# Patient Record
Sex: MALE | Race: WHITE | NOT HISPANIC OR LATINO | Employment: FULL TIME | URBAN - METROPOLITAN AREA
[De-identification: names, ages, dates, MRNs, and addresses within clinical notes are randomized per-mention and may not be internally consistent; named-entity substitution may affect disease eponyms.]

---

## 2018-09-26 ENCOUNTER — OFFICE VISIT (OUTPATIENT)
Dept: URGENT CARE | Facility: MEDICAL CENTER | Age: 33
End: 2018-09-26
Payer: COMMERCIAL

## 2018-09-26 VITALS
DIASTOLIC BLOOD PRESSURE: 84 MMHG | HEIGHT: 69 IN | SYSTOLIC BLOOD PRESSURE: 121 MMHG | HEART RATE: 65 BPM | RESPIRATION RATE: 16 BRPM | WEIGHT: 164 LBS | BODY MASS INDEX: 24.29 KG/M2 | TEMPERATURE: 98.2 F

## 2018-09-26 DIAGNOSIS — J02.9 ACUTE PHARYNGITIS, UNSPECIFIED ETIOLOGY: Primary | ICD-10-CM

## 2018-09-26 LAB — S PYO AG THROAT QL: NEGATIVE

## 2018-09-26 PROCEDURE — 99203 OFFICE O/P NEW LOW 30 MIN: CPT

## 2018-09-26 PROCEDURE — 87430 STREP A AG IA: CPT

## 2018-09-26 PROCEDURE — S9088 SERVICES PROVIDED IN URGENT: HCPCS

## 2018-09-26 RX ORDER — AZITHROMYCIN 250 MG/1
TABLET, FILM COATED ORAL
Qty: 6 TABLET | Refills: 0 | Status: SHIPPED | OUTPATIENT
Start: 2018-09-26 | End: 2018-09-26 | Stop reason: SDUPTHER

## 2018-09-26 RX ORDER — AZITHROMYCIN 250 MG/1
TABLET, FILM COATED ORAL
Qty: 6 TABLET | Refills: 0 | Status: SHIPPED | OUTPATIENT
Start: 2018-09-26 | End: 2018-09-30

## 2018-09-26 NOTE — PATIENT INSTRUCTIONS
Pharyngitis  zithromax as directed  Follow up with PCP in 3-5 days  Proceed to  ER if symptoms worsen  Pharyngitis   WHAT YOU NEED TO KNOW:   Pharyngitis, or sore throat, is inflammation of the tissues and structures in your pharynx (throat)  Pharyngitis is most often caused by bacteria  It may also be caused by a cold or flu virus  Other causes include smoking, allergies, or acid reflux  DISCHARGE INSTRUCTIONS:   Call 911 for any of the following:   · You have trouble breathing or swallowing because your throat is swollen or sore  Return to the emergency department if:   · You are drooling because it hurts too much to swallow  · Your fever is higher than 102? F (39?C) or lasts longer than 3 days  · You are confused  · You taste blood in your throat  Contact your healthcare provider if:   · Your throat pain gets worse  · You have a painful lump in your throat that does not go away after 5 days  · Your symptoms do not improve after 5 days  · You have questions or concerns about your condition or care  Medicines:  Viral pharyngitis will go away on its own without treatment  Your sore throat should start to feel better in 3 to 5 days for both viral and bacterial infections  You may need any of the following:  · Antibiotics  treat a bacterial infection  · NSAIDs , such as ibuprofen, help decrease swelling, pain, and fever  NSAIDs can cause stomach bleeding or kidney problems in certain people  If you take blood thinner medicine, always ask your healthcare provider if NSAIDs are safe for you  Always read the medicine label and follow directions  · Acetaminophen  decreases pain and fever  It is available without a doctor's order  Ask how much to take and how often to take it  Follow directions  Acetaminophen can cause liver damage if not taken correctly  · Take your medicine as directed    Contact your healthcare provider if you think your medicine is not helping or if you have side effects  Tell him or her if you are allergic to any medicine  Keep a list of the medicines, vitamins, and herbs you take  Include the amounts, and when and why you take them  Bring the list or the pill bottles to follow-up visits  Carry your medicine list with you in case of an emergency  Manage your symptoms:   · Gargle salt water  Mix ¼ teaspoon salt in an 8 ounce glass of warm water and gargle  This may help decrease swelling in your throat  · Drink liquids as directed  You may need to drink more liquids than usual  Liquids may help soothe your throat and prevent dehydration  Ask how much liquid to drink each day and which liquids are best for you  · Use a cool-steam humidifier  to help moisten the air in your room and calm your cough  · Soothe your throat  with cough drops, ice, soft foods, or popsicles  Prevent the spread of pharyngitis:  Cover your mouth and nose when you cough or sneeze  Do not share food or drinks  Wash your hands often  Use soap and water  If soap and water are unavailable, use an alcohol based hand   Follow up with your healthcare provider as directed:  Write down your questions so you remember to ask them during your visits  © 2017 [employer]  Information is for End User's use only and may not be sold, redistributed or otherwise used for commercial purposes  All illustrations and images included in CareNotes® are the copyrighted property of [employer]  or [employer] Hernandes  The above information is an  only  It is not intended as medical advice for individual conditions or treatments  Talk to your doctor, nurse or pharmacist before following any medical regimen to see if it is safe and effective for you

## 2018-09-26 NOTE — PROGRESS NOTES
[facility]        NAME: Donita Duke is a 35 y o  male  : 1985    MRN: [id number]  DATE: 2018  TIME: 7:16 PM    Assessment and Plan   Acute pharyngitis, unspecified etiology [J02 9]  1  Acute pharyngitis, unspecified etiology  azithromycin (ZITHROMAX) 250 mg tablet         Patient Instructions     Pharyngitis  zithromax as directed  Follow up with PCP in 3-5 days  Proceed to  ER if symptoms worsen  Chief Complaint     Chief Complaint   Patient presents with    Sore Throat     started 3 days ago, had other sx which has subsided  Wife has recently had strep         History of Present Illness       Patient c/o sore throat x 4 days  States his wife was diagnosed with strep 4 days ago  Denies CP, SOB, n/v, diaphoresis        Review of Systems   Review of Systems   Constitutional: Negative  HENT: Positive for sore throat  Negative for congestion, dental problem, ear pain, facial swelling, postnasal drip, rhinorrhea, sinus pain, tinnitus, trouble swallowing and voice change  Eyes: Negative  Respiratory: Negative  Negative for apnea, cough, choking, chest tightness, shortness of breath, wheezing and stridor  Cardiovascular: Negative  Negative for chest pain           Current Medications       Current Outpatient Prescriptions:     fluticasone (VERAMYST) 27 5 MCG/SPRAY nasal spray, 2 sprays into each nostril daily, Disp: , Rfl:     azithromycin (ZITHROMAX) 250 mg tablet, Take 2 tablets today then 1 tablet daily x 4 days, Disp: 6 tablet, Rfl: 0    Current Allergies     Allergies as of 2018    (No Known Allergies)            The following portions of the patient's history were reviewed and updated as appropriate: allergies, current medications, past family history, past medical history, past social history, past surgical history and problem list      Past Medical History:   Diagnosis Date    GERD (gastroesophageal reflux disease)        Past Surgical History: Procedure Laterality Date    NO PAST SURGERIES         No family history on file  Medications have been verified  Objective   /84 (Patient Position: Sitting)   Pulse 65   Temp 98 2 °F (36 8 °C) (Temporal)   Resp 16   Ht 5' 9" (1 753 m)   Wt 74 4 kg (164 lb)   BMI 24 22 kg/m²        Physical Exam     Physical Exam   Constitutional: He appears well-developed and well-nourished  No distress  HENT:   Head: Normocephalic and atraumatic  Right Ear: Hearing, tympanic membrane, external ear and ear canal normal    Left Ear: Hearing, tympanic membrane, external ear and ear canal normal    Mouth/Throat: Uvula is midline and mucous membranes are normal  Oropharyngeal exudate and posterior oropharyngeal erythema present  No posterior oropharyngeal edema or tonsillar abscesses  Eyes: Conjunctivae and EOM are normal  Pupils are equal, round, and reactive to light  Neck: Normal range of motion  Neck supple  Cardiovascular: Normal rate, regular rhythm, normal heart sounds and intact distal pulses  Pulmonary/Chest: Effort normal and breath sounds normal  No respiratory distress  He has no wheezes  He has no rales  He exhibits no tenderness  Lymphadenopathy:     He has cervical adenopathy  Skin: He is not diaphoretic

## 2018-11-11 ENCOUNTER — HOSPITAL ENCOUNTER (EMERGENCY)
Facility: HOSPITAL | Age: 33
Discharge: HOME | End: 2018-11-11
Attending: EMERGENCY MEDICINE
Payer: COMMERCIAL

## 2018-11-11 VITALS
HEART RATE: 73 BPM | RESPIRATION RATE: 20 BRPM | TEMPERATURE: 97.5 F | SYSTOLIC BLOOD PRESSURE: 118 MMHG | BODY MASS INDEX: 25.3 KG/M2 | OXYGEN SATURATION: 99 % | WEIGHT: 171.3 LBS | DIASTOLIC BLOOD PRESSURE: 73 MMHG

## 2018-11-11 DIAGNOSIS — T78.40XA ALLERGIC REACTION, INITIAL ENCOUNTER: Primary | ICD-10-CM

## 2018-11-11 DIAGNOSIS — F41.1 ANXIETY REACTION: ICD-10-CM

## 2018-11-11 PROCEDURE — 99283 EMERGENCY DEPT VISIT LOW MDM: CPT

## 2018-11-11 RX ORDER — PREDNISONE 20 MG/1
60 TABLET ORAL DAILY
Qty: 15 TABLET | Refills: 0 | Status: SHIPPED | OUTPATIENT
Start: 2018-11-11 | End: 2018-11-16

## 2018-11-11 RX ORDER — PREDNISONE 20 MG/1
60 TABLET ORAL ONCE
Status: COMPLETED | OUTPATIENT
Start: 2018-11-11 | End: 2018-11-11

## 2018-11-11 RX ORDER — DIPHENHYDRAMINE HCL 25 MG
25 TABLET ORAL ONCE
Status: COMPLETED | OUTPATIENT
Start: 2018-11-11 | End: 2018-11-11

## 2018-11-11 RX ORDER — MULTIVITAMIN
1 CAPSULE ORAL DAILY
COMMUNITY

## 2018-11-11 RX ADMIN — DIPHENHYDRAMINE HCL 25 MG: 25 TABLET ORAL at 21:25

## 2018-11-11 RX ADMIN — PREDNISONE 60 MG: 20 TABLET ORAL at 21:25

## 2018-11-12 NOTE — ED PROVIDER NOTES
History  Chief Complaint   Patient presents with    Allergic Reaction     per spouse"pt helped his friend move friend has cats pt came home with some hives on his neck but they went away and pt  has since been complaining that his throat is itching and burning wife gave him children benadryl 10ml about 30 mns ago"     40-year-old male presents for evaluation with chief complaint of now resolved hives as well as the sensation of throat closing  Patient has a history of known CAD allergy and today was helping a friend move who has a CT  When he got home he had hives on his neck and on his face  Patient was given some liquid Benadryl that was at home (10 mL of Children's Benadryl presumed 25 mg total)  Since then the hives have improved and resolved (patient also tub shower)  However he continued to have throat closing sensation  Patient believes that this is more likely due to anxiety than allergic reaction as he has a significant history of anxiety attacks  Patient reports he was hyperventilating on the drive over here and had carpal spasm as well as circumoral numbness  Patient states he still has a sensation of throat swelling but his skin is clear at this time  History provided by:  Patient and spouse   used: No    Allergic Reaction   Presenting symptoms: itching, rash and swelling    Presenting symptoms: no difficulty swallowing    Severity:  Mild  Duration:  2 hours  Prior allergic episodes:  Animal allergies  Context: animal exposure    Relieved by: Antihistamines  Worsened by:  Nothing      Prior to Admission Medications   Prescriptions Last Dose Informant Patient Reported? Taking?    Multiple Vitamin (MULTIVITAMIN) capsule   Yes Yes   Sig: Take 1 capsule by mouth daily   fluticasone (VERAMYST) 27 5 MCG/SPRAY nasal spray  Self Yes Yes   Si sprays into each nostril daily      Facility-Administered Medications: None       Past Medical History:   Diagnosis Date    Anxiety attack     GERD (gastroesophageal reflux disease)        Past Surgical History:   Procedure Laterality Date    NO PAST SURGERIES         History reviewed  No pertinent family history  I have reviewed and agree with the history as documented  Social History   Substance Use Topics    Smoking status: Former Smoker    Smokeless tobacco: Never Used    Alcohol use Yes      Comment: occasionally        Review of Systems   Constitutional: Negative for chills, diaphoresis and fever  HENT: Negative for trouble swallowing  Sensation of throat swelling     Respiratory: Negative for shortness of breath  Cardiovascular: Negative for chest pain and palpitations  Gastrointestinal: Negative for diarrhea, nausea and vomiting  Genitourinary: Negative for dysuria and frequency  Skin: Positive for itching and rash  Psychiatric/Behavioral: The patient is nervous/anxious  All other systems reviewed and are negative  Physical Exam  Physical Exam   Constitutional: He is oriented to person, place, and time  He appears well-developed and well-nourished  He appears distressed (mild)  HENT:   Head: Normocephalic and atraumatic  Eyes: Pupils are equal, round, and reactive to light  EOM are normal    Neck: Normal range of motion  No JVD present  Cardiovascular: Normal rate, regular rhythm, normal heart sounds and intact distal pulses  Exam reveals no gallop and no friction rub  No murmur heard  Pulmonary/Chest: Effort normal and breath sounds normal  No respiratory distress  He has no wheezes  He has no rales  He exhibits no tenderness  Musculoskeletal: Normal range of motion  He exhibits no tenderness  Neurological: He is alert and oriented to person, place, and time  Skin: Skin is warm and dry  No rash noted  Psychiatric: His behavior is normal  Judgment and thought content normal  His mood appears anxious  Nursing note and vitals reviewed        Vital Signs  ED Triage Vitals [11/11/18 2103]   Temperature Pulse Respirations Blood Pressure SpO2   97 5 °F (36 4 °C) 95 20 153/81 98 %      Temp Source Heart Rate Source Patient Position - Orthostatic VS BP Location FiO2 (%)   Oral Monitor Lying Right arm --      Pain Score       No Pain           Vitals:    11/11/18 2103 11/11/18 2219   BP: 153/81 118/73   Pulse: 95 73   Patient Position - Orthostatic VS: Lying Lying       Visual Acuity      ED Medications  Medications   diphenhydrAMINE (BENADRYL) tablet 25 mg (25 mg Oral Given 11/11/18 2125)   predniSONE tablet 60 mg (60 mg Oral Given 11/11/18 2125)       Diagnostic Studies  Results Reviewed     None                 No orders to display              Procedures  Procedures       Phone Contacts  ED Phone Contact    ED Course  ED Course as of Nov 11 2238   Sun Nov 11, 2018 2236 Patient is feeling much better, no sensation of throat closing, no return of hives, no shortness of breath  MDM  Number of Diagnoses or Management Options  Allergic reaction, initial encounter: new and does not require workup  Anxiety reaction: new and does not require workup  Diagnosis management comments: 35 y o  male presents with s/s consistent with allergic reaction  Presumed allergen is cat dander  No signs of anaphylaxis at this time  Will treat symptomatically with steroids, antihistamines +/- epinephrine  Risk of Complications, Morbidity, and/or Mortality  Management options: high    Patient Progress  Patient progress: stable    CritCare Time    Disposition  Final diagnoses:    Allergic reaction, initial encounter - acute to cat   Anxiety reaction - acute prompted by allergic reaction     Time reflects when diagnosis was documented in both MDM as applicable and the Disposition within this note     Time User Action Codes Description Comment    11/11/2018 10:37 PM Chichi ROGERS Add [T78 40XA] Allergic reaction, initial encounter     11/11/2018 10:37 PM Mckinley Mcclure Modify [T78 40XA] Allergic reaction, initial encounter acute to cat    11/11/2018 10:37 PM Gianna Tipton Add [F41 1] Anxiety reaction     11/11/2018 10:37 PM Francoise ROGERS Modify [F41 1] Anxiety reaction acute prompted by allergic reaction      ED Disposition     ED Disposition Condition Comment    Discharge  [facility] discharge to home/self care  Condition at discharge: Good        Follow-up Information     Follow up With Specialties Details Why Verna Cardoso MD Family Medicine Schedule an appointment as soon as possible for a visit As needed [id number] [facility] [place] [address] [place] [id number]  [phone]            Patient's Medications   Discharge Prescriptions    PREDNISONE 20 MG TABLET    Take 3 tablets (60 mg total) by mouth daily for 5 days       Start Date: 11/11/2018End Date: 11/16/2018       Order Dose: 60 mg       Quantity: 15 tablet    Refills: 0     No discharge procedures on file      ED Provider  Electronically Signed by           Rowan Gordillo MD  11/11/18 0907

## 2018-11-12 NOTE — DISCHARGE INSTRUCTIONS
General Allergic Reaction   AMBULATORY CARE:   A general allergic reaction  is your body's response to an allergen  Allergens include medicines, food, insect stings, animal dander, mold, latex, chemicals, and dust mites  Pollen from trees, grass, and weeds can also cause an allergic reaction  Seek care immediately if:   · You have a skin rash, hives, swelling, or itching that gets worse  · You have trouble breathing, shortness of breath, wheezing, or coughing  · Your throat tightens, or your lips or tongue swell  · You have trouble swallowing or speaking  · You have dizziness, lightheadedness, fainting, or confusion  · You have nausea, vomiting, diarrhea, or abdominal cramps  · You have chest pain or tightness  Contact your healthcare provider if:   · You have questions or concerns about your condition or care  Treatment for a general allergic reaction  may include medicines to relieve certain allergy symptoms such as itching, sneezing, and swelling  You may take them as a pill or use drops in your nose or eyes  Topical treatments may be given to put directly on your skin to help decrease itching or swelling  Manage allergic reactions:   · Avoid the allergen  that you think may have caused your allergic reaction  · Use cold compresses  on your skin or eyes if they were affected by the allergic reaction  Cold compresses may help to soothe your skin or eyes  · Rinse your nasal passages  with a saline solution  Daily rinsing may help clear your nose of allergens  · Do not smoke  Your allergy symptoms may decrease if you are not around smoke  Nicotine and other chemicals in cigarettes and cigars can also cause lung damage  Ask your healthcare provider for information if you currently smoke and need help to quit  E-cigarettes or smokeless tobacco still contain nicotine  Talk to your healthcare provider before you use these products    Follow up with your healthcare provider as directed:  Write down your questions so you remember to ask them during your visits  © 2017 [employer] Information is for End User's use only and may not be sold, redistributed or otherwise used for commercial purposes  All illustrations and images included in CareNotes® are the copyrighted property of [employer]  or [employer]and[employer]  The above information is an  only  It is not intended as medical advice for individual conditions or treatments  Talk to your doctor, nurse or pharmacist before following any medical regimen to see if it is safe and effective for you

## 2022-11-03 ENCOUNTER — HOSPITAL ENCOUNTER (EMERGENCY)
Facility: HOSPITAL | Age: 37
Discharge: HOME | End: 2022-11-03
Attending: EMERGENCY MEDICINE

## 2022-11-03 VITALS
SYSTOLIC BLOOD PRESSURE: 139 MMHG | HEART RATE: 100 BPM | TEMPERATURE: 98.5 F | DIASTOLIC BLOOD PRESSURE: 86 MMHG | OXYGEN SATURATION: 99 % | RESPIRATION RATE: 18 BRPM

## 2022-11-03 DIAGNOSIS — T70.0XXA OTITIC BAROTRAUMA, INITIAL ENCOUNTER: Primary | ICD-10-CM
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
## 2022-11-12 NOTE — ED ATTENDING ATTESTATION
11/3/2022  Rainer Adams DO, saw and evaluated the patient  I have discussed the patient with the resident/non-physician practitioner and agree with the resident's/non-physician practitioner's findings, Plan of Care, and MDM as documented in the resident's/non-physician practitioner's note, except where noted  All available labs and Radiology studies were reviewed  I was present for key portions of any procedure(s) performed by the resident/non-physician practitioner and I was immediately available to provide assistance  At this point I agree with the current assessment done in the Emergency Department    I have conducted an independent evaluation of this patient a history and physical is as follows:    ED Course         Critical Care Time  Procedures

## 2023-06-10 ENCOUNTER — HOSPITAL ENCOUNTER (EMERGENCY)
Facility: HOSPITAL | Age: 38
Discharge: HOME | End: 2023-06-10
Attending: EMERGENCY MEDICINE
Payer: COMMERCIAL

## 2023-06-10 VITALS
SYSTOLIC BLOOD PRESSURE: 142 MMHG | TEMPERATURE: 98.2 F | HEART RATE: 85 BPM | OXYGEN SATURATION: 96 % | DIASTOLIC BLOOD PRESSURE: 89 MMHG | RESPIRATION RATE: 18 BRPM

## 2023-06-10 DIAGNOSIS — S61.012A LACERATION OF LEFT THUMB WITHOUT FOREIGN BODY WITHOUT DAMAGE TO NAIL, INITIAL ENCOUNTER: Primary | ICD-10-CM

## 2023-06-10 PROCEDURE — 90471 IMMUNIZATION ADMIN: CPT

## 2023-06-10 PROCEDURE — 90715 TDAP VACCINE 7 YRS/> IM: CPT

## 2023-06-10 PROCEDURE — 99282 EMERGENCY DEPT VISIT SF MDM: CPT

## 2023-06-10 RX ADMIN — TETANUS TOXOID, REDUCED DIPHTHERIA TOXOID AND ACELLULAR PERTUSSIS VACCINE, ADSORBED 0.5 ML: 5; 2.5; 8; 8; 2.5 SUSPENSION INTRAMUSCULAR at 16:32

## 2023-06-10 NOTE — ED PROVIDER NOTES
History  Chief Complaint   Patient presents with   • Finger Injury     Laceration from utility knife to left thumb     Yael Massey is a 40year old male with no pertinent PMHx presenting to the ED for a left thumb laceration sustained shortly prior to arrival to the ED  Actively bleeding, has gauze and tape around it currently  Has pain over the area with numbness right around the wound, but otherwise no paresthesia  No associated symptoms  Tetanus status is unknown by the patient  Was using a utility knife cutting when he missed and hit the medial side of his finger  Believes he did not hit the nail bed, knows the nail is still there, unsure if part of the nail was cut  Prior to Admission Medications   Prescriptions Last Dose Informant Patient Reported? Taking? Multiple Vitamin (MULTIVITAMIN) capsule   Yes No   Sig: Take 1 capsule by mouth daily   fluticasone (VERAMYST) 27 5 MCG/SPRAY nasal spray  Self Yes No   Si sprays into each nostril daily      Facility-Administered Medications: None       Past Medical History:   Diagnosis Date   • Anxiety attack    • GERD (gastroesophageal reflux disease)        Past Surgical History:   Procedure Laterality Date   • NO PAST SURGERIES         No family history on file  I have reviewed and agree with the history as documented  E-Cigarette/Vaping   • E-Cigarette Use Never User      E-Cigarette/Vaping Substances   • Nicotine No    • THC No    • CBD No      Social History     Tobacco Use   • Smoking status: Former   • Smokeless tobacco: Never   Vaping Use   • Vaping Use: Never used   Substance Use Topics   • Alcohol use: Yes     Comment: occasionally   • Drug use: No       Review of Systems   Constitutional: Negative for chills, diaphoresis and fever  Eyes: Negative for photophobia and visual disturbance  Respiratory: Negative for cough and shortness of breath  Cardiovascular: Negative for chest pain, palpitations and leg swelling     Gastrointestinal: Negative for nausea and vomiting  Musculoskeletal: Negative for arthralgias and myalgias  Skin: Positive for wound  Negative for color change  Neurological: Negative for light-headedness, numbness and headaches  Psychiatric/Behavioral: Negative for self-injury  Physical Exam  Physical Exam  Vitals reviewed  Constitutional:       General: He is not in acute distress  Appearance: Normal appearance  He is ill-appearing  He is not toxic-appearing or diaphoretic  Comments: Left thumb wrapped in gauze with painters tape holding gauze together  HENT:      Head: Normocephalic and atraumatic  Right Ear: External ear normal       Left Ear: External ear normal       Nose: Nose normal    Eyes:      General: No scleral icterus  Right eye: No discharge  Left eye: No discharge  Extraocular Movements: Extraocular movements intact  Conjunctiva/sclera: Conjunctivae normal    Cardiovascular:      Rate and Rhythm: Normal rate and regular rhythm  Pulses: Normal pulses  Heart sounds: Normal heart sounds  Pulmonary:      Effort: Pulmonary effort is normal       Breath sounds: Normal breath sounds  Abdominal:      General: Abdomen is flat  Musculoskeletal:         General: Normal range of motion  Right wrist: Normal       Left wrist: Normal       Right hand: Normal       Left hand: Laceration and tenderness present  No bony tenderness  Normal range of motion  Normal sensation  Normal capillary refill  Normal pulse  Hands:       Cervical back: Normal range of motion and neck supple  Skin:     General: Skin is warm and dry  Capillary Refill: Capillary refill takes less than 2 seconds  Findings: Laceration present  Comments:   Left medial thumb is with a laceration about 1 5 - 2 cm in length, extending from the side to the tip  No damage to the nailbed/nail  Actively bleeding  No foreign body, no bone or tendon involvement   The laceration appears like he cut the corner of his thumb off vs  laceration where I could pull the sides together to repair  Neurological:      General: No focal deficit present  Mental Status: He is alert  Sensory: Sensation is intact  Psychiatric:         Mood and Affect: Mood normal          Behavior: Behavior normal          Vital Signs  ED Triage Vitals [06/10/23 1532]   Temperature Pulse Respirations Blood Pressure SpO2   98 2 °F (36 8 °C) 95 18 142/89 96 %      Temp src Heart Rate Source Patient Position - Orthostatic VS BP Location FiO2 (%)   -- -- -- -- --      Pain Score       --           Vitals:    06/10/23 1532 06/10/23 1545   BP: 142/89 142/89   Pulse: 95 85         Visual Acuity      ED Medications  Medications   tetanus-diphtheria-acellular pertussis (BOOSTRIX) IM injection 0 5 mL (0 5 mL Intramuscular Given 6/10/23 1632)       Diagnostic Studies  Results Reviewed     None                 No orders to display              Procedures  Procedures         ED Course                                             Medical Decision Making  40year old male presenting with finger injury sustained to the left thumb shortly prior to arrival  Please see physical exam for description of findings  Pt offered a nerve block, but refused stating he would prefer to not have that done  No x rays indicated as the slice is not deep enough where bone would have been involved  Thoroughly cleansed the wound with sterile saline  Applied surgicele, followed by xeroform, followed by 2 gauze rolls, followed by cling wrap  Patient tolerated this well considering no nerve block with lidocaine  Patient remained with good ROM of the thumb  Tetanus status is unknown, offered to the patient and he accepted this  Updated today in the ED  Referral placed to hand surgery to follow up to ensure proper healing  Supportive care discussed to ensure good healing  Pt stable at time of discharge, vital signs reviewed, questions answered   Strict ER return precautions provided/discussed and were well understood by patient  Risk  Prescription drug management  Disposition  Final diagnoses:   Laceration of left thumb without foreign body without damage to nail, initial encounter     Time reflects when diagnosis was documented in both MDM as applicable and the Disposition within this note     Time User Action Codes Description Comment    6/10/2023  4:23 PM Omero Gaston Laceration of left thumb without foreign body without damage to nail, initial encounter       ED Disposition     ED Disposition   Discharge    Condition   Stable    Date/Time   Sat Rashard 10, 2023  4:23 PM    Comment   [address] [place] discharge to home/self care                 Follow-up Information     Follow up With Specialties Details Why Contact Info Additional [address] Emergency Department Emergency Medicine Go to  If symptoms worsen [address] [facility] [id number] [place] [address] Emergency Department, [address] [place], [place], [facility] Maribel Phipps MD Orthopedic Surgery, Hand Surgery Schedule an appointment as soon as possible for a visit   [place]  [phone]             Discharge Medication List as of 6/10/2023  4:28 PM      CONTINUE these medications which have NOT CHANGED    Details   fluticasone (VERAMYST) 27 5 MCG/SPRAY nasal spray 2 sprays into each nostril daily, Historical Med      Multiple Vitamin (MULTIVITAMIN) capsule Take 1 capsule by mouth daily, Historical Med                 PDMP Review     None          ED Provider  Electronically Signed by           Winsome Sanchez PA-C  06/10/23 2040

## 2023-06-10 NOTE — DISCHARGE INSTRUCTIONS
Keep the wrap clean and dry for 4-5 days  Can consider making an appointment with hand surgery, I referred you to Dr Kieran Gutierrez, the phone number to the office is provided below  Ibuprofen and Tylenol rotating doses every 3 hours (Tylenol when you go home, 3 hours later take Ibuprofen, 3 hours later take Tylenol, etc )  Do not exceed 3,000 mg of Tylenol per day (4,000 mg if no other Tylenol in medications  Do not exceed 2,400 mg of ibuprofen per day  Keep up with this to maintain pain relief  Return to the ER for : fevers, chills, red streaking up the arm, worsening pain, worsening bleeding, chest pain, passing out, overall worsening or concerning symptoms

## 2023-06-12 ENCOUNTER — TELEPHONE (OUTPATIENT)
Dept: OBGYN CLINIC | Facility: HOSPITAL | Age: 38
End: 2023-06-12

## 2023-06-12 NOTE — TELEPHONE ENCOUNTER
Patient is being referred to a orthopedics  Please schedule accordingly      [address]   [phone]

## 2023-06-15 ENCOUNTER — OFFICE VISIT (OUTPATIENT)
Dept: OBGYN CLINIC | Facility: CLINIC | Age: 38
End: 2023-06-15
Payer: COMMERCIAL

## 2023-06-15 VITALS
HEART RATE: 69 BPM | WEIGHT: 172 LBS | HEIGHT: 69 IN | BODY MASS INDEX: 25.48 KG/M2 | DIASTOLIC BLOOD PRESSURE: 83 MMHG | SYSTOLIC BLOOD PRESSURE: 116 MMHG

## 2023-06-15 DIAGNOSIS — S61.012A LACERATION OF LEFT THUMB WITHOUT FOREIGN BODY WITHOUT DAMAGE TO NAIL, INITIAL ENCOUNTER: ICD-10-CM

## 2023-06-15 PROCEDURE — 99203 OFFICE O/P NEW LOW 30 MIN: CPT | Performed by: SURGERY

## 2023-06-15 NOTE — PROGRESS NOTES
ORTHOPAEDIC HAND, WRIST, AND ELBOW OFFICE  VISIT       ASSESSMENT/PLAN:      40 y o male with left thumb injury    Etiology of above diagnosis was discussed at length as well as treatment options  Physical exam was performed today  New dressing was applied  Patient can beginning washing area with warm soapy water and letting air dry before applying new dressing  bandaids are ok as well, which ever he is more comfortable with  NSAIDs as needed for pain  Patient will return to office as needed  The patient verbalized understanding of exam findings and treatment plan  We engaged in the shared decision-making process and treatment options were discussed at length with the patient  Surgical and conservative management discussed today along with risks and benefits  Diagnoses and all orders for this visit:    Laceration of left thumb without foreign body without damage to nail, initial encounter  -     Ambulatory Referral to Hand Surgery    Follow Up:  Return if symptoms worsen or fail to improve  To Do Next Visit:  Re-evaluation of current issue  _________________________________________________________________________________________________________________________________________    CHIEF COMPLAINT:  Chief Complaint   Patient presents with   • Left Hand - Pain     Left thumb      SUBJECTIVE:  Rosalene Blizzard is a 40y o  year old RHD male who presents for initial evaluation of left thumb laceration, DOI  6/10/23  Patient reports he was using a new knife while cutting plastic and it sliced through the thumb, no damage to the nail  Patient presented to ED immediately and dressing was placed  He reports tenderness at site  No numbness and tingling       Pain/symptom timing:  Worse during the day when active  Pain/symptom context:  Worse with activites and work  Pain/symptom modifying factors:  Rest makes better, activities make worse  Pain/symptom associated signs/symptoms: none    Prior treatment   · NSAIDsYes "  · Injections No   · Bracing/Orthotics No    Physical Therapy No     I have personally reviewed all the relevant PMH, PSH, SH, FH, Medications and allergies    PAST MEDICAL HISTORY:  Past Medical History:   Diagnosis Date   • Anxiety attack    • GERD (gastroesophageal reflux disease)      PAST SURGICAL HISTORY:  Past Surgical History:   Procedure Laterality Date   • NO PAST SURGERIES       FAMILY HISTORY:  History reviewed  No pertinent family history  SOCIAL HISTORY:  Social History     Tobacco Use   • Smoking status: Former   • Smokeless tobacco: Never   Vaping Use   • Vaping Use: Never used   Substance Use Topics   • Alcohol use: Yes     Comment: occasionally   • Drug use: No     MEDICATIONS:    Current Outpatient Medications:   •  fluticasone (VERAMYST) 27 5 MCG/SPRAY nasal spray, 2 sprays into each nostril daily, Disp: , Rfl:   •  Multiple Vitamin (MULTIVITAMIN) capsule, Take 1 capsule by mouth daily, Disp: , Rfl:     ALLERGIES:  No Known Allergies    REVIEW OF SYSTEMS:  Review of Systems   Constitutional: Negative for chills and fever  HENT: Negative for ear pain and sore throat  Eyes: Negative for pain and visual disturbance  Respiratory: Negative for cough and shortness of breath  Cardiovascular: Negative for chest pain and palpitations  Gastrointestinal: Negative for abdominal pain and vomiting  Genitourinary: Negative for dysuria and hematuria  Musculoskeletal: Negative for arthralgias and back pain  Skin: Positive for wound  Negative for color change and rash  Neurological: Negative for seizures and syncope  All other systems reviewed and are negative      VITALS:  Vitals:    06/15/23 1056   BP: 116/83   Pulse: 69     LABS:  HgA1c: No results found for: \"HGBA1C\"  BMP: No results found for: \"BUN\", \"CALCIUM\", \"CL\", \"CO2\", \"CREATININE\", \"GLUCOSE\", \"K\", \"NA\"  _____________________________________________________  PHYSICAL EXAMINATION:  General: well developed and well nourished, " alert, oriented times 3 and appears comfortable  Psychiatric: Normal  HEENT: Normocephalic, Atraumatic Trachea Midline, No torticollis  Pulmonary: No audible wheezing or respiratory distress   Abdomen/GI: Non tender, non distended   Cardiovascular: No pitting edema, 2+ radial pulse   Skin: No Masses, No Erythema, Laceration , No Ulcerations  Neurovascular: Sensation Intact to the Median, Ulnar, Radial Nerve, Motor Intact to the Median, Ulnar, Radial Nerve and Pulses Intact  Musculoskeletal: Normal, except as noted in detailed exam and in HPI      MUSCULOSKELETAL EXAMINATION:  Left thumb:  Clean  Area of granulation tissue  Small area of macerated skin surrounding wound  Good thumb IP motion  Tender on palpation    ___________________________________________________  STUDIES REVIEWED:  No imaging to review    PROCEDURES PERFORMED:  Procedures  No Procedures performed today  _____________________________________________________    Supriya Lozada    I,:  Silvestre Mathur am acting as a scribe while in the presence of the attending physician :       I,:  Enrico Brooks MD personally performed the services described in this documentation    as scribed in my presence :

## 2025-03-27 ENCOUNTER — TELEPHONE (OUTPATIENT)
Age: 40
End: 2025-03-27

## 2025-03-27 NOTE — TELEPHONE ENCOUNTER
Received call from Patient's Wife on behalf of Patient for New Patient - Skin Check - SOC on ankle, scabbing after a bad sunburn, also SOC on top of head that scabs.     Scheduled 12/22/25 10:00 am Mauro Burris. Updated insurance, provided [place] [employer].     Patient's Wife verbalized understanding, and also said that she may check Patient's insurance to see if they can be rescheduled into sooner appt in [facility].

## 2025-05-26 ENCOUNTER — HOSPITAL ENCOUNTER (EMERGENCY)
Facility: HOSPITAL | Age: 40
Discharge: HOME | End: 2025-05-26
Attending: EMERGENCY MEDICINE | Admitting: EMERGENCY MEDICINE
Payer: COMMERCIAL

## 2025-05-26 ENCOUNTER — APPOINTMENT (EMERGENCY)
Dept: RADIOLOGY | Facility: HOSPITAL | Age: 40
End: 2025-05-26
Payer: COMMERCIAL

## 2025-05-26 VITALS
OXYGEN SATURATION: 96 % | HEART RATE: 83 BPM | TEMPERATURE: 98.2 F | DIASTOLIC BLOOD PRESSURE: 98 MMHG | SYSTOLIC BLOOD PRESSURE: 129 MMHG | RESPIRATION RATE: 18 BRPM

## 2025-05-26 DIAGNOSIS — R07.89 CHEST TIGHTNESS: Primary | ICD-10-CM

## 2025-05-26 DIAGNOSIS — R42 LIGHTHEADEDNESS: ICD-10-CM

## 2025-05-26 DIAGNOSIS — F41.9 ANXIOUSNESS: ICD-10-CM

## 2025-05-26 LAB
ALBUMIN SERPL BCG-MCNC: 5 G/DL (ref 3.5–5)
ALP SERPL-CCNC: 66 U/L (ref 34–104)
ALT SERPL W P-5'-P-CCNC: 23 U/L (ref 7–52)
ANION GAP SERPL CALCULATED.3IONS-SCNC: 8 MMOL/L (ref 4–13)
AST SERPL W P-5'-P-CCNC: 20 U/L (ref 13–39)
BASOPHILS # BLD AUTO: 0.07 THOUSANDS/ÂΜL (ref 0–0.1)
BASOPHILS NFR BLD AUTO: 1 % (ref 0–1)
BILIRUB SERPL-MCNC: 0.36 MG/DL (ref 0.2–1)
BUN SERPL-MCNC: 12 MG/DL (ref 5–25)
CALCIUM SERPL-MCNC: 10.2 MG/DL (ref 8.4–10.2)
CARDIAC TROPONIN I PNL SERPL HS: <2 NG/L (ref ?–50)
CARDIAC TROPONIN I PNL SERPL HS: <2 NG/L (ref ?–50)
CHLORIDE SERPL-SCNC: 100 MMOL/L (ref 96–108)
CO2 SERPL-SCNC: 29 MMOL/L (ref 21–32)
CREAT SERPL-MCNC: 0.97 MG/DL (ref 0.6–1.3)
EOSINOPHIL # BLD AUTO: 0.04 THOUSAND/ÂΜL (ref 0–0.61)
EOSINOPHIL NFR BLD AUTO: 0 % (ref 0–6)
ERYTHROCYTE [DISTWIDTH] IN BLOOD BY AUTOMATED COUNT: 12.5 % (ref 11.6–15.1)
GFR SERPL CREATININE-BSD FRML MDRD: 97 ML/MIN/1.73SQ M
GLUCOSE SERPL-MCNC: 126 MG/DL (ref 65–140)
HCT VFR BLD AUTO: 48.4 % (ref 36.5–49.3)
HGB BLD-MCNC: 16.4 G/DL (ref 12–17)
IMM GRANULOCYTES # BLD AUTO: 0.04 THOUSAND/UL (ref 0–0.2)
IMM GRANULOCYTES NFR BLD AUTO: 0 % (ref 0–2)
LYMPHOCYTES # BLD AUTO: 2.01 THOUSANDS/ÂΜL (ref 0.6–4.47)
LYMPHOCYTES NFR BLD AUTO: 19 % (ref 14–44)
MCH RBC QN AUTO: 28.5 PG (ref 26.8–34.3)
MCHC RBC AUTO-ENTMCNC: 33.9 G/DL (ref 31.4–37.4)
MCV RBC AUTO: 84 FL (ref 82–98)
MONOCYTES # BLD AUTO: 0.75 THOUSAND/ÂΜL (ref 0.17–1.22)
MONOCYTES NFR BLD AUTO: 7 % (ref 4–12)
NEUTROPHILS # BLD AUTO: 7.76 THOUSANDS/ÂΜL (ref 1.85–7.62)
NEUTS SEG NFR BLD AUTO: 73 % (ref 43–75)
NRBC BLD AUTO-RTO: 0 /100 WBCS
PLATELET # BLD AUTO: 304 THOUSANDS/UL (ref 149–390)
PMV BLD AUTO: 9.8 FL (ref 8.9–12.7)
POTASSIUM SERPL-SCNC: 4.2 MMOL/L (ref 3.5–5.3)
PROT SERPL-MCNC: 8 G/DL (ref 6.4–8.4)
RBC # BLD AUTO: 5.75 MILLION/UL (ref 3.88–5.62)
SODIUM SERPL-SCNC: 137 MMOL/L (ref 135–147)
WBC # BLD AUTO: 10.67 THOUSAND/UL (ref 4.31–10.16)

## 2025-05-26 PROCEDURE — 84484 ASSAY OF TROPONIN QUANT: CPT

## 2025-05-26 PROCEDURE — 85025 COMPLETE CBC W/AUTO DIFF WBC: CPT

## 2025-05-26 PROCEDURE — 71045 X-RAY EXAM CHEST 1 VIEW: CPT

## 2025-05-26 PROCEDURE — 80053 COMPREHEN METABOLIC PANEL: CPT

## 2025-05-26 PROCEDURE — 99284 EMERGENCY DEPT VISIT MOD MDM: CPT

## 2025-05-26 PROCEDURE — 99285 EMERGENCY DEPT VISIT HI MDM: CPT | Performed by: EMERGENCY MEDICINE

## 2025-05-26 PROCEDURE — 36415 COLL VENOUS BLD VENIPUNCTURE: CPT

## 2025-05-26 PROCEDURE — 93005 ELECTROCARDIOGRAM TRACING: CPT

## 2025-05-26 RX ORDER — HYDROXYZINE HYDROCHLORIDE 25 MG/1
25 TABLET, FILM COATED ORAL ONCE
Status: COMPLETED | OUTPATIENT
Start: 2025-05-26 | End: 2025-05-26

## 2025-05-26 RX ORDER — HYDROXYZINE HYDROCHLORIDE 25 MG/1
25 TABLET, FILM COATED ORAL EVERY 6 HOURS
Qty: 12 TABLET | Refills: 0 | Status: SHIPPED | OUTPATIENT
Start: 2025-05-26

## 2025-05-26 RX ADMIN — HYDROXYZINE HYDROCHLORIDE 25 MG: 25 TABLET, FILM COATED ORAL at 20:06

## 2025-05-26 NOTE — ED PROVIDER NOTES
Time reflects when diagnosis was documented in both MDM as applicable and the Disposition within this note       Time User Action Codes Description Comment    5/26/2025 11:16 PM Lauren Hernandez Add [R07.89] Chest tightness     5/26/2025 11:16 PM Ofeila Hernandeze Add [R42] Lightheadedness     5/26/2025 11:16 PM Lauren Hernandez Add [F41.9] Anxiousness           ED Disposition       ED Disposition   Discharge    Condition   Stable    Date/Time   Mon May 26, 2025 11:16 PM    Comment   Thomas Cali discharge to home/self care.                   Assessment & Plan       Medical Decision Making  Amount and/or Complexity of Data Reviewed  Labs: ordered. Decision-making details documented in ED Course.  Radiology: ordered and independent interpretation performed.    Risk  Prescription drug management.      See ED course for MDM.    ED Course as of 05/27/25 0125   Mon May 26, 2025   2050 DDX including but not limited to: chest wall pain, pericarditis, PTX, pneumonia, ACS   2316 hs TnI 2hr: <2  Low suspicion for ACS per [facility]   2316 No acute findings on workup. Suspect patient's mild WBC elevation is likely reactive. No fevers, coughs, URI sx, dysuria to indicate bacterial infection that requires abx.   CXR unremarkable. Sx improved with atarax, will send patient home with rx for atarax and PCP f/u. Heart score 2. Pt denies any SI/HI/AVH, instructed patient to return to the ER should he have any. Pt voices understanding and agrees with plan. All questions and concerns addressed at this time.       Medications   hydrOXYzine HCL (ATARAX) tablet 25 mg (25 mg Oral Given 5/26/25 2006)       ED Risk Strat Scores   HEART Risk Score      Flowsheet Row Most Recent Value   Heart Score Risk Calculator    History 0 Filed at: 05/26/2025 2319   ECG 1 Filed at: 05/26/2025 2319   Age 0 Filed at: 05/26/2025 2319   Risk Factors 0 Filed at: 05/26/2025 2319   Troponin 0 Filed at: 05/26/2025 2319   HEART Score 1 Filed at: 05/26/2025 2319           HEART Risk Score      Flowsheet Row Most Recent Value   Heart Score Risk Calculator    History 0 Filed at: 05/26/2025 2319   ECG 1 Filed at: 05/26/2025 2319   Age 0 Filed at: 05/26/2025 2319   Risk Factors 0 Filed at: 05/26/2025 2319   Troponin 0 Filed at: 05/26/2025 2319   HEART Score 1 Filed at: 05/26/2025 2319                      No data recorded                            History of Present Illness       Chief Complaint   Patient presents with    Dizziness     Dizziness with associated panic attacks, headache, HTN. Symptom onset Friday. Reports an out of body sensation. Has suffered from anxiety in the past but has not required meds for approximately 8 yrs.        Past Medical History[1]   Past Surgical History[2]   Family History[3]   Social History[4]   E-Cigarette/Vaping    E-Cigarette Use Never User       E-Cigarette/Vaping Substances    Nicotine No     THC No     CBD No       I have reviewed and agree with the history as documented.     HPI    Pt is a 40 yo male w/ history of anxiety was previously on alprazolam 8 years ago presenting with chest tightness and lightheadedness 1 hour PTA. Pt reports having 1 similar episode 2 days ago that resolved. Also associated with parethesias of bilateral thigh, generalized weakness. Denies SOB, vomiting, LOC, abd pain, fevers, chills, URI symptoms or any other sx.    Of note, patient reports he has been under a lot of personal stress: loss of friend and health issues for his wife.    Review of Systems  As per HPI      Objective       ED Triage Vitals   Temperature Pulse Blood Pressure Respirations SpO2 Patient Position - Orthostatic VS   05/26/25 1912 05/26/25 1911 05/26/25 1911 05/26/25 1911 05/26/25 1911 05/26/25 1911   98.2 °F (36.8 °C) 97 142/97 18 98 % Sitting      Temp Source Heart Rate Source BP Location FiO2 (%) Pain Score    05/26/25 1912 05/26/25 1911 05/26/25 1911 -- --    Oral Monitor Left arm        Vitals      Date and Time Temp Pulse SpO2 Resp  BP Pain Score FACES Pain Rating User   05/26/25 2330 -- 83 96 % -- -- -- --    05/26/25 2230 -- 84 97 % -- -- -- --    05/26/25 2130 -- 88 95 % -- -- -- --    05/26/25 2100 -- 87 96 % -- -- -- --    05/26/25 2030 -- 106 95 % -- 129/98 -- --    05/26/25 1915 -- 94 98 % -- 142/97 -- -- KB   05/26/25 1912 98.2 °F (36.8 °C) -- -- -- -- -- -- EM   05/26/25 1911 -- 97 98 % 18 142/97 -- -- EM            Physical Exam  Vitals and nursing note reviewed.   Constitutional:       General: He is not in acute distress.     Appearance: Normal appearance. He is not ill-appearing, toxic-appearing or diaphoretic.   HENT:      Head: Normocephalic and atraumatic.      Nose: Nose normal.     Eyes:      Extraocular Movements: Extraocular movements intact.      Conjunctiva/sclera: Conjunctivae normal.       Cardiovascular:      Rate and Rhythm: Normal rate and regular rhythm.      Pulses: Normal pulses.      Heart sounds: No murmur heard.     No friction rub. No gallop.   Pulmonary:      Effort: Pulmonary effort is normal. No respiratory distress.      Breath sounds: Normal breath sounds. No stridor. No wheezing.   Abdominal:      General: Bowel sounds are normal.      Palpations: Abdomen is soft.      Tenderness: There is no abdominal tenderness. There is no guarding or rebound.     Musculoskeletal:         General: No swelling or tenderness. Normal range of motion.      Cervical back: Normal range of motion. No rigidity or tenderness.      Right lower leg: No edema.      Left lower leg: No edema.     Skin:     General: Skin is warm and dry.      Capillary Refill: Capillary refill takes less than 2 seconds.     Neurological:      Mental Status: He is alert and oriented to person, place, and time.      Cranial Nerves: No cranial nerve deficit.      Sensory: No sensory deficit.      Motor: No weakness.      Comments: No visual field deficits, denies changes in visual acuity  Pupils PERRLA, EOM intact  Sensation intact and equal  in upper, middle, and lower face  Able to open mouth fully, no TMJ tenderness, able to puff out cheeks, able keep eyes closed through tension  No facial droop or dysarthria  Able to hear finger rub equally b/l  Able to stick out tongue and move in both directions  No uvular deviation  Able to turn head and shrug shoulders against resistance  Finger to nose and heel to shin normal. Gait not assessed.     Psychiatric:         Mood and Affect: Mood is anxious.         Results Reviewed       Procedure Component Value Units Date/Time    HS Troponin I 2hr [[id number]] Collected: 05/26/25 2236    Lab Status: Final result Specimen: Blood from Arm, Left Updated: 05/26/25 2304     hs TnI 2hr <2 ng/L      Delta 2hr hsTnI --    HS Troponin I 4hr [[id number]]     Lab Status: No result Specimen: Blood     Comprehensive metabolic panel [[id number]] Collected: 05/26/25 2031    Lab Status: Final result Specimen: Blood from Arm, Left Updated: 05/26/25 2127     Sodium 137 mmol/L      Potassium 4.2 mmol/L      Chloride 100 mmol/L      CO2 29 mmol/L      ANION GAP 8 mmol/L      BUN 12 mg/dL      Creatinine 0.97 mg/dL      Glucose 126 mg/dL      Calcium 10.2 mg/dL      AST 20 U/L      ALT 23 U/L      Alkaline Phosphatase 66 U/L      Total Protein 8.0 g/dL      Albumin 5.0 g/dL      Total Bilirubin 0.36 mg/dL      eGFR 97 ml/min/1.73sq m     Narrative:      National Kidney Disease Foundation guidelines for Chronic Kidney Disease (CKD):     Stage 1 with normal or high GFR (GFR > 90 mL/min/1.73 square meters)    Stage 2 Mild CKD (GFR = 60-89 mL/min/1.73 square meters)    Stage 3A Moderate CKD (GFR = 45-59 mL/min/1.73 square meters)    Stage 3B Moderate CKD (GFR = 30-44 mL/min/1.73 square meters)    Stage 4 Severe CKD (GFR = 15-29 mL/min/1.73 square meters)    Stage 5 End Stage CKD (GFR <15 mL/min/1.73 square meters)  Note: GFR calculation is accurate only with a steady state creatinine    HS Troponin 0hr (reflex protocol) [[id number]]  (Normal)  Collected: 05/26/25 2031    Lab Status: Final result Specimen: Blood from Arm, Left Updated: 05/26/25 2106     hs TnI 0hr <2 ng/L     CBC and differential [[id number]]  (Abnormal) Collected: 05/26/25 2031    Lab Status: Final result Specimen: Blood from Arm, Left Updated: 05/26/25 2040     WBC 10.67 Thousand/uL      RBC 5.75 Million/uL      Hemoglobin 16.4 g/dL      Hematocrit 48.4 %      MCV 84 fL      MCH 28.5 pg      MCHC 33.9 g/dL      RDW 12.5 %      MPV 9.8 fL      Platelets 304 Thousands/uL      nRBC 0 /100 WBCs      Segmented % 73 %      Immature Grans % 0 %      Lymphocytes % 19 %      Monocytes % 7 %      Eosinophils Relative 0 %      Basophils Relative 1 %      Absolute Neutrophils 7.76 Thousands/µL      Absolute Immature Grans 0.04 Thousand/uL      Absolute Lymphocytes 2.01 Thousands/µL      Absolute Monocytes 0.75 Thousand/µL      Eosinophils Absolute 0.04 Thousand/µL      Basophils Absolute 0.07 Thousands/µL             XR chest 1 view portable   ED Interpretation by Lauren Hernandez MD (05/26 2204)   No acute cardiopulmonary findings per my independent interpretation          ECG 12 Lead Documentation Only    Date/Time: 5/26/2025 7:24 PM    Performed by: Lauren Hernandez MD  Authorized by: Lauren Hernandez MD    ECG reviewed by me, the ED Provider: yes    Patient location:  ED  Previous ECG:     Previous ECG:  Unavailable    Comparison to cardiac monitor: Yes    Rate:     ECG rate:  99    ECG rate assessment: normal    Rhythm:     Rhythm: sinus tachycardia    Ectopy:     Ectopy: none    QRS:     QRS axis:  Normal    QRS intervals:  Normal  Conduction:     Conduction: abnormal      Abnormal conduction: incomplete RBBB    ST segments:     ST segments:  Normal  T waves:     T waves: non-specific        ED Medication and Procedure Management   Prior to Admission Medications   Prescriptions Last Dose Informant Patient Reported? Taking?   Multiple Vitamin (MULTIVITAMIN) capsule 5/26/2025 at  3:00 PM  Yes Yes   Sig: Take  no 1 capsule by mouth in the morning.   fluticasone (VERAMYST) 27.5 MCG/SPRAY nasal spray More than a month Self Yes No   Si sprays into each nostril in the morning.      Facility-Administered Medications: None     Discharge Medication List as of 2025 11:25 PM        START taking these medications    Details   hydrOXYzine HCL (ATARAX) 25 mg tablet Take 1 tablet (25 mg total) by mouth every 6 (six) hours, Starting Mon 2025, Normal           CONTINUE these medications which have NOT CHANGED    Details   Multiple Vitamin (MULTIVITAMIN) capsule Take 1 capsule by mouth in the morning., Historical Med      fluticasone (VERAMYST) 27.5 MCG/SPRAY nasal spray 2 sprays into each nostril in the morning., Historical Med             ED SEPSIS DOCUMENTATION   Time reflects when diagnosis was documented in both MDM as applicable and the Disposition within this note       Time User Action Codes Description Comment    2025 11:16 PM Lauren Hernandez [R07.89] Chest tightness     2025 11:16 PM Lauren Hernandez [R42] Lightheadedness     2025 11:16 PM Lauren Hernandez [F41.9] Anxiousness                      [1]   Past Medical History:  Diagnosis Date    Anxiety attack     GERD (gastroesophageal reflux disease)    [2]   Past Surgical History:  Procedure Laterality Date    NO PAST SURGERIES     [3] No family history on file.  [4]   Social History  Tobacco Use    Smoking status: Former    Smokeless tobacco: Never   Vaping Use    Vaping status: Never Used   Substance Use Topics    Alcohol use: Yes     Comment: occasionally    Drug use: No        Lauren Hernandez MD  25 0128

## 2025-05-27 NOTE — DISCHARGE INSTRUCTIONS
Today you were seen in the emergency department for chest tightness. Your workup included cardiac workup: EKG, labs, chest x-ray. At this time there does not appear to be an emergent life threatening cause to explain your symptoms. You are stable for discharge.     You can access your current and pending results through [facility]'s [facility]. A radiologist will take a second look at your X-Rays, if you had any, and you will be contacted with any new findings.     Please follow up with your primary care provider in the next 2-3 days and review all results discussed today with your primary care provider.     Your workup revealed no emergent features at this time; however, many disease processes are dynamic:    Please return to the emergency department as soon as possible if you develop uncontrollable fevers (Temp >100.4), chest pain, shortness of breath, difficulty breathing, dizziness, abdominal pain, persistent nausea/vomiting, syncope or passing out, blood in your urine or stool, coughing up blood, leg swelling/pain, urinary retention, bowel or bladder incontinence, numbness between your legs.    Thank you for choosing [facility] for your care.

## 2025-05-27 NOTE — ED ATTENDING ATTESTATION
5/26/2025  IBryce DO, saw and evaluated the patient. I have discussed the patient with the resident/non-physician practitioner and agree with the resident's/non-physician practitioner's findings, Plan of Care, and MDM as documented in the resident's/non-physician practitioner's note, except where noted. All available labs and Radiology studies were reviewed.  I was present for key portions of any procedure(s) performed by the resident/non-physician practitioner and I was immediately available to provide assistance.       At this point I agree with the current assessment done in the Emergency Department.  I have conducted an independent evaluation of this patient a history and physical is as follows:    39-year-old male presents emergency department with wife for episodes of dizziness associated with chest tightness, fluttering sensation, and an out of body sensation.  He states it feels like panic attacks he used to have.  Denies any fevers or chills.  Denies any chest pain.  No shortness of breath however wife states he seems to hold his breath during these episodes.  They can last an hour or 2, he states it can happen randomly cut is not exertional.  When he goes back home it starts getting better.  No syncope.  No spinning sensation, no focal numbness or weakness, no visual changes.    Upon physical examination, patient overall appears well, not in acute distress, heart regular rate and rhythm, lungs are auscultation bilaterally, no focal neurological deficit, extremities well-perfused.    Per my independent interpretation of EKG, normal sinus rhythm with heart rate of 99, narrow QRS, incomplete right bundle branch block, normal axis, no STEMI.    ED Course  ED Course as of 05/27/25 0005   Mon May 26, 2025   2055 XR chest 1 view portable  Per my independent interpretation of chest x-ray, no acute cardiopulmonary processes.     Blood work is reassuring.  Patient updated with all results.  Low suspicion  for ACS.  Doubt acute aortic pathology or PE.  Patient feeling better upon repeat evaluation.  Symptoms likely due to panic attack/anxiety.  Advised PCP follow-up, return precautions were discussed.    Critical Care Time  Procedures

## 2025-06-01 LAB
ATRIAL RATE: 99 BPM
P AXIS: 41 DEGREES
PR INTERVAL: 124 MS
QRS AXIS: 29 DEGREES
QRSD INTERVAL: 94 MS
QT INTERVAL: 340 MS
QTC INTERVAL: 436 MS
T WAVE AXIS: 0 DEGREES
VENTRICULAR RATE: 99 BPM

## 2025-06-01 PROCEDURE — 93010 ELECTROCARDIOGRAM REPORT: CPT | Performed by: INTERNAL MEDICINE
